# Patient Record
Sex: FEMALE | Race: WHITE
[De-identification: names, ages, dates, MRNs, and addresses within clinical notes are randomized per-mention and may not be internally consistent; named-entity substitution may affect disease eponyms.]

---

## 2018-08-16 ENCOUNTER — HOSPITAL ENCOUNTER (EMERGENCY)
Dept: HOSPITAL 97 - ER | Age: 33
Discharge: HOME | End: 2018-08-16
Payer: SELF-PAY

## 2018-08-16 DIAGNOSIS — Z88.3: ICD-10-CM

## 2018-08-16 DIAGNOSIS — H66.92: Primary | ICD-10-CM

## 2018-08-16 DIAGNOSIS — I10: ICD-10-CM

## 2018-08-16 PROCEDURE — 87070 CULTURE OTHR SPECIMN AEROBIC: CPT

## 2018-08-16 PROCEDURE — 81003 URINALYSIS AUTO W/O SCOPE: CPT

## 2018-08-16 PROCEDURE — 99283 EMERGENCY DEPT VISIT LOW MDM: CPT

## 2018-08-16 PROCEDURE — 87081 CULTURE SCREEN ONLY: CPT

## 2018-08-16 NOTE — EDPHYS
Physician Documentation                                                                           

 [facility]                                                                

Name: Crystal Beaulieu                                                                                 

Age: 33 yrs                                                                                       

Sex: Female                                                                                       

: 1985                                                                                   

MRN: [id number]                                                                                   

Arrival Date: 2018                                                                          

Time: 20:08                                                                                       

Account#: [id number]                                                                            

Bed 19                                                                                            

Private MD:                                                                                       

ED Physician Hernandez De La Cruz                                                                      

HPI:                                                                                              

                                                                                             

21:25 This 33 yrs old  Female presents to ER via Ambulatory with complaints of Ear   cp  

      Pain, Sore Throat.                                                                          

21:25 The patient presents with pain, that is acute, tenderness. The complaints affect the    cp  

      left ear.                                                                                   

21:25 Onset: The symptoms/episode began/occurred 3 day(s) ago.                                cp  

21:25 Associated signs and symptoms: Pertinent positives: sore throat, Pertinent negatives:   cp  

      cough, fever, sinus trouble, vomiting. Severity of symptoms: in the emergency               

      department the symptoms are unchanged despite home interventions.                           

                                                                                                  

OB/GYN:                                                                                           

20:17 LMP 2018                                                                           aj  

                                                                                                  

Historical:                                                                                       

- Allergies:                                                                                      

20:17 Erythromycin;                                                                           aj  

- Home Meds:                                                                                      

20:17 Paxil Oral [Active];                                                                    aj  

- PMHx:                                                                                           

20:17 Hypertension;                                                                           aj  

- PSHx:                                                                                           

20:17 None;                                                                                   aj  

                                                                                                  

- Immunization history:: Adult Immunizations up to date.                                          

- Social history:: Smoking status: Patient/guardian denies using tobacco.                         

- Ebola Screening: : Patient negative for fever greater than or equal to 101.5 degrees            

  Fahrenheit, and additional compatible Ebola Virus Disease symptoms Patient denies               

  exposure to infectious person Patient denies travel to an Ebola-affected area in the            

  21 days before illness onset No symptoms or risks identified at this time.                      

                                                                                                  

                                                                                                  

ROS:                                                                                              

21:30 Constitutional: Negative for body aches, chills, fever, poor PO intake.                 cp  

21:30 Eyes: Negative for injury, pain, redness, and discharge.                                cp  

21:30 ENT: Positive for ear pain, sore throat, Negative for drainage from ear(s), sinus pain,     

      difficulty swallowing, difficulty handling secretions.                                      

21:30 Neck: Negative for pain with movement, pain at rest, stiffness.                             

21:30 Cardiovascular: Negative for chest pain.                                                    

21:30 Respiratory: Negative for cough, shortness of breath, wheezing.                             

21:30 Abdomen/GI: Negative for abdominal pain, nausea, vomiting, and diarrhea.                    

21:30 Skin: Negative for cellulitis, rash.                                                        

21:30 Neuro: Negative for altered mental status, headache, weakness.                              

21:30 All other systems are negative.                                                             

                                                                                                  

Exam:                                                                                             

21:35 Constitutional: The patient appears in no acute distress, alert, awake, non-toxic, well cp  

      developed, well nourished, uncomfortable.                                                   

21:35 Head/Face:  Normocephalic, atraumatic.                                                  cp  

21:35 Eyes: Periorbital structures: appear normal, Pupils: equal, round, and reactive to          

      light and accomodation, Extraocular movements: intact throughout, Conjunctiva: normal,      

      no exudate, no injection, Sclera: no appreciated abnormality, Lids and lashes: appear       

      normal, bilaterally.                                                                        

21:35 ENT: External ear(s): are unremarkable, Ear canal(s): are normal, clear, TM's:              

      erythema, that is mild, on the left, Examination of the other ear shows no obvious          

      abnormality, Nose: is normal, Mouth: is normal, Posterior pharynx: Airway: normal,          

      Tonsils: with erythema, no enlargement, no exudate, Uvula: midline, swelling, is not        

      appreciated, erythema, that is mild, exudate, is not appreciated, Voice: is normal.         

21:35 Neck: ROM/movement: is normal, is supple, without pain, no range of motions                 

      limitations, no meningismus, no nuchal rigidity, Lymph nodes: lymphadenopathy is            

      appreciated, anterior cervical nodes.                                                       

21:35 Chest/axilla: Inspection: normal, Palpation: is normal, no crepitus, no tenderness.         

21:35 Cardiovascular: Rate: normal, Rhythm: regular.                                              

21:35 Respiratory: the patient does not display signs of respiratory distress,  Respirations:     

      normal, no use of accessory muscles, no retractions, no splinting, no tachypnea,            

      labored breathing, is not present, Breath sounds: are clear throughout, no decreased        

      breath sounds, no stridor, no wheezing.                                                     

21:35 Abdomen/GI: Exam negative for discomfort, distension, guarding, Inspection: abdomen         

      appears normal.                                                                             

21:35 Skin: cellulitis, is not appreciated, no rash present.                                      

21:35 Neuro: Orientation: to person, place \T\ time. Mentation: lucid, able to follow commands,   

      Cerebellar function: is grossly normal, Motor: moves all fours, strength is normal,         

      Sensation: no obvious gross deficits.                                                       

                                                                                                  

Vital Signs:                                                                                      

20:17  / 88; Pulse 95; Resp 16; Temp 98.2; Pulse Ox 96% on R/A; Weight 90.72 kg; Height aj  

      5 ft. 6 in. (167.64 cm);                                                                    

21:17  / 90; Pulse 81; Resp 16 S; Pulse Ox 98% on R/A;                                  bs1 

22:17  / 88; Pulse 80; Resp 16; Pulse Ox 100% on R/A;                                   bs1 

23:17  / 94; Pulse 81; Resp 16; Temp 98.0(O); Pulse Ox 99% on R/A; Pain 4/10;           bs1 

20:17 Body Mass Index 32.28 (90.72 kg, 167.64 cm)                                             aj  

                                                                                                  

MDM:                                                                                              

21:19 Patient medically screened.                                                             cp  

22:00 Differential diagnosis: otitis media, otitis externa, ruptured TM, acute otalgia,       cp  

      cerumen impaction, strep throat.                                                            

22:35 Data reviewed: vital signs, nurses notes, lab test result(s), and as a result, I will   cp  

      discharge patient.                                                                          

22:35 Counseling: I had a detailed discussion with the patient and/or guardian regarding: the cp  

      historical points, exam findings, and any diagnostic results supporting the                 

      discharge/admit diagnosis, lab results, to return to the emergency department if            

      symptoms worsen or persist or if there are any questions or concerns that arise at          

      home. Response to treatment: the patient's symptoms have markedly improved after            

      treatment, and as a result, I will discharge patient.                                       

                                                                                                  

                                                                                             

21:23 Order name: Strep; Complete Time: 22:34                                                   

                                                                                             

21:58 Order name: Throat Culture                                                              EDMS

                                                                                             

22:00 Order name: Urine Dipstick--Ancillary (enter results); Complete Time: 22:34             ms  

                                                                                             

22:34 Interpretation: Normal except: USPGR >1.030; UKET 1+.                                     

                                                                                             

21:26 Order name: Urine Pregnancy Test (obtain specimen); Complete Time: 22:03                cp  

                                                                                             

21:26 Order name: Urine Dipstick-Ancillary (obtain specimen); Complete Time: 22:03            cp  

                                                                                                  

Administered Medications:                                                                         

22:12 Drug: Ibuprofen 800 mg Route: PO;                                                       bs1 

23:18 Follow up: Response: No adverse reaction                                                bs1 

22:12 Drug: Lortab Liquid 15 ml Route: PO;                                                    bs1 

23:18 Follow up: Response: No adverse reaction                                                bs1 

22:56 Drug: Augmentin 875 mg Route: PO;                                                       bs1 

23:18 Follow up: Response: No adverse reaction                                                bs1 

                                                                                                  

                                                                                                  

Disposition:                                                                                      

                                                                                             

07:26 Co-signature as Attending Physician, Hernandez De La Cruz MD I agree with the assessment and  [facility] 

      plan of care.                                                                               

                                                                                                  

Disposition:                                                                                      

18 22:36 Discharged to Home. Impression: Otitis media, unspecified, left ear.               

- Condition is Stable.                                                                            

- Discharge Instructions: Otitis Media, Adult.                                                    

- Prescriptions for Naprosyn 500 mg Oral Tablet - take 1 tablet by ORAL route 2 times             

  per day take with food; 20 tablet. Tylenol- Codeine #3 300-30 mg Oral Tablet - take 2           

  tablets by ORAL route every 6 hours As needed; 15 tablet. Augmentin 875- 125 mg Oral            

  Tablet - take 1 tablet by ORAL route every 12 hours for 10 days; 20 tablet.                     

- Medication Reconciliation Form, Thank You Letter, Antibiotic Education, Prescription            

  Opioid Use, Work release form form.                                                             

- Follow up: Private Physician; When: 1 - 2 days; Reason: Recheck today's complaints.             

- Problem is new.                                                                                 

- Symptoms have improved.                                                                         

                                                                                                  

                                                                                                  

                                                                                                  

Signatures:                                                                                       

Dispatcher [employer]                           EDMS                                                 

Delma Greenfield RN                       Hernandez Perdomo MD MD cha Page, Corey, PA                         PA   Tiffanie Redmond, RN                   RN   bs1                                                  

                                                                                                  

Corrections: (The following items were deleted from the chart)                                    

                                                                                             

23:19 22:36 2018 22:36 Discharged to Home. Impression: Otitis media, unspecified, left  bs1 

      ear. Condition is Stable. Forms are Medication Reconciliation Form, Thank You Letter,       

      Antibiotic Education, Prescription Opioid Use. Follow up: Private Physician; When: 1 -      

      2 days; Reason: Recheck today's complaints. Problem is new. Symptoms have improved. cp      

                                                                                                  

**************************************************************************************************

## 2018-08-16 NOTE — XMS REPORT
Patient Summary Document

 Created on:2018



Patient:MINERVA PORTILLO

Sex:Female

:1985

External Reference #:[id number]





Demographics







 Address  [address] [place] [id number]

 

 Home Phone  [phone]

 

 Email Address  NONE

 

 Preferred Language  Unknown

 

 Marital Status  Unknown

 

 Islam Affiliation  Unknown

 

 Race  Unknown

 

 Additional Race(s)  Unavailable

 

 Ethnic Group  Unknown









Author







 Organization  [facility]

 

 Address  [address] [place] [address] [address]



   [place] [address]

 

 Phone  [phone]









Care Team Providers







 Name  Role  Phone

 

 DR TRICIA DE LEON  Unavailable  Unavailable

 

 TEREZA BLACKWOOD  Unavailable  Unavailable

 

 HALLEY,   Unavailable  Unavailable

 

 ENARACELIS, DR HAINES  Unavailable  Unavailable

 

 BA, DR CASTREJON  Unavailable  Unavailable

 

 DARIEN, DR IBANEZ  Unavailable  Unavailable

 

 , DR LEDESMA  Unavailable  Unavailable









Problems

This patient has no known problems.



Allergies, Adverse Reactions, Alerts

This patient has no known allergies or adverse reactions.



Medications

This patient has no known medications.



Encounters







 Start  End  Encounter  Admission  Attending  Care  Care  Encounter



 Date/Time  Date/Time  Type  Type  Clinicians  Facility  Department  ID

 

 2018  Outpatient  E  MOISES  [facility]  [id number]



 19:28:00  23:11:00      TRICIA      

 

 2018  Outpatient  E  JAISON  [facility]  [id number]



 22:28:00  01:55:00      TEREZA      

 

 2018  Emergency  E  LARON ROWLEY  [facility]  [id number]



 15:09:00  19:50:00            

 

 2018  Emergency  E  ADARSH  [facility]  [facility]  [id number]



 00:26:00  01:22:00      ZAKI      

 

 2018  Emergency  E  CASH JOHNSON  [facility]  [id number]



 19:54:00  22:51:00            

 

 2018-01-15  2018-01-15  Emergency  E  DARIEN  [facility]  [facility]  [id number]



 21:40:00  22:52:00      SHAMAR      

 

 2017  Emergency  E  SHEIKH  [facility]  [id number]



 00:07:00  00:56:00      WASIM      







Results







 Test Description  Test Time  Test Comments  Text Results  Atomic Results  
Result Comments









 PROTHROMBIN TIME i-STAT **OW**  2018 06:44:00    









   Test Item  Value  Reference Range  Comments









 PT (test code=PT1)  <10.0 s  10.0-13.0  

 

 INR (test code=INR)  <0.9    

 

 INRH (test code=INRH)       **** SUGGESTED THERAPEUTIC RANGE FOR INR: ****    



      2.5 - 3.5 **  For Patients with Prosthetic    



   Valves or Patients with    



   recurrent Thromboembolic Events **  2.0 - 3.0 **    



   For Most Other Applications **    



CT CHEST W/ CONTRAST *OW*2018 22:43:55Examination: Chest CT with 
contrastLocation code: [id number]: NoneTechnique:Axial postcontrast 
contiguous images were obtained through the chest followedby coronal and 
sagittal reformations. One or more of the following dosereduction techniques 
were used: Automated exposure control, adjustment of themA and or KV according 
to patient size, and/or utilization of iterativereconstruction 
technique.Creatinine 1.0, 82 cc Omnipaque 300Discussion:Clinical history is 
remarkable for dyspnea. Thyroid gland, trachea, and majorbronchi are within 
normal limits. There are no pathologically enlarged lymphnodes present within 
the mediastinum or axilla.No consolidation, effusion, or pneumothorax is 
appreciated.Fatty infiltration of the liver is present. Cystic changes present 
involvingthe posterior right lobe segment 7.Small hiatal hernia is present.No 
lytic or blastic lesions are present within the osseous structures.Impression:
1. No acute cardiopulmonary abnormality.TROPONIN I i-STAT **OW**2018 21:45
:00





 Test Item  Value  Reference Range  Comments

 

 TROPONIN I (test code=A84)  0.000 ng/mL  0.000-0.045  



CHEM8+ i-STAT **OW**2018 21:34:00





 Test Item  Value  Reference Range  Comments

 

 SODIUM (test code=MOSES)  141 mmol/L  138-146  

 

 POTASSIUM (test code=KI)  4.1 mmol/L  3.5-4.9  

 

 CHLORIDE (test code=CLI)  102 mmol/L    

 

 CA IONIZED (test code=ICAI)  1.18 mmol/L  1.12-1.32  

 

 GLUCOSE (test code=GLUI)  81 mg/dL    

 

 TCO2 (test code=TCO2)  28 mmol/L  24-29  

 

 BUN (test code=BUN1)  18 mg/dL  8-26  

 

 CREATININE (test code=CREAI)  1.0 mg/dL  0.6-1.3  

 

 ANION GAP (test code=GANG)  17.0 mmol/L    



PREGNANCY URINE **OW**2018 20:52:00





 Test Item  Value  Reference Range  Comments

 

 PREG UR (test code=PGU)  NEGATIVE  NEGATIVE  



CBC  (INCLUDES AUTOMATED DIFFERENTIAL) *2018 20:27:00





 Test Item  Value  Reference Range  Comments

 

 WBC (test code=WBC)  5.4 10\S\3/uL  4.5-11.0  

 

 RBC (test code=RBC)  4.65 10\S\6/uL  4.30-5.70  

 

 HGB (test code=HBG)  13.3 g/dL  12.0-15.5  

 

 HCT (test code=HCT)  40.3 %  35.0-44.0  

 

 MCV (test code=MCV)  86.7 fL  81.0-99.0  

 

 MCH (test code=MCH)  28.6 pg  27.0-31.0  

 

 MCHC (test code=MCHC)  33.0 g/dL  32.0-36.0  

 

 RDW (test code=RDW)  12.8 %  11.5-14.5  

 

 PLT (test code=PLT)  326 10\S\3/uL  130-400  

 

 MPV (test code=MPV)  7.9 fL  9.4-12.4  

 

 NEUTROP # (test code=NE#)  2.7 10\S\3/uL  1.6-8.0  

 

 LYMPH # (test code=LY#)  2.1 10\S\3/uL  1.1-3.5  

 

 MID # (test code=GMID#)  0.6 10\S\3/uL  0.0-1.1  

 

 GRA  % (test code=GRA%)  50.0 %  35.0-73.0  

 

 LYMPH % (test code=GLY%)  39.4 %  20.0-55.0  

 

 MID % (test code=GMID%)  10.6 %  0.0-10.0  



PROTHROMBIN TIME i-STAT **OW**2018 14:07:00





 Test Item  Value  Reference Range  Comments

 

 PT (test code=PT1)  12.2 s  10.0-13.0  

 

 INR (test code=INR)  1.0    

 

 INRH (test code=INRH)       **** SUGGESTED THERAPEUTIC RANGE    



   FOR INR: ****    2.5 - 3.5 **  For    



   Patients with Prosthetic Valves or    



   Patients with    



   recurrent Thromboembolic Events **    



   2.0 - 3.0 ** For Most Other    



   Applications **    



CBC  (INCLUDES AUTOMATED DIFFERENTIAL) *2018 14:02:00





 Test Item  Value  Reference Range  Comments

 

 WBC (test code=WBC)  2.6 10\S\3/uL  4.5-11.0  

 

 RBC (test code=RBC)  6.63 10\S\6/uL  4.30-5.70  

 

 HGB (test code=HBG)  18.6 g/dL  12.0-15.5  

 

 HCT (test code=HCT)  57.3 %  35.0-44.0  

 

 MCV (test code=MCV)  86.4 fL  81.0-99.0  

 

 MCH (test code=MCH)  28.1 pg  27.0-31.0  

 

 MCHC (test code=MCHC)  32.5 g/dL  32.0-36.0  

 

 RDW (test code=RDW)  12.6 %  11.5-14.5  

 

 PLT (test code=PLT)  117 10\S\3/uL  130-400  

 

 MPV (test code=MPV)  8.7 fL  9.4-12.4  

 

 NEUTROP # (test code=NE#)  1.0 10\S\3/uL  1.6-8.0  

 

 LYMPH # (test code=LY#)  1.3 10\S\3/uL  1.1-3.5  

 

 MID # (test code=GMID#)  0.3 10\S\3/uL  0.0-1.1  

 

 GRA  % (test code=GRA%)  39.8 %  35.0-73.0  

 

 LYMPH % (test code=GLY%)  49.0 %  20.0-55.0  

 

 MID % (test code=GMID%)  11.2 %  0.0-10.0  



U/S VENOUS DOPPLER LT UPPER EXT *OW*2018 00:30:16AFTER HOURS SERVICE ON:  12:30 AMLeft Upper Extremity Venous Duplex Doppler ExaminationLocation 
Code U44Xppudgk: M79.89: OTHER SPECIFIED SOFT TISSUE DISORDERSTechnique: Real-
time gray scale, Doppler spectral analysis and Doppler colorflow evaluation was 
performed using a dedicated transducer. Graded compressionwith augmentation 
were performed. Findings: Upper extremity veins were sampled including the 
jugular, subclavian, axillary,brachial, basilic and cephalic veins. No 
echogenic filling defects are seen to suggest deep venous thrombosis. Thereis 
normal response to compression.  There are normal venous waveforms.  Impression:
No sonographic evidence of DVT in the imaged vessels.U/S [id number] 19:45:
12EXAM: US PELVIS TRANSABDOMINALEXAM: US PELVIS TRANSVAGINALDATE: 2018 6:04 
PM INDICATION: Pelvic pain, concern for ovarian torsion ADDITIONAL INFORMATION: 
33-year-old G6 P [id number]: None. TECHNIQUE:  Multiplanar grayscale and 
color Doppler ultrasound of the pelviswere obtained:Transabdominally through a 
distended urinary bladder.Transvaginally postvoid.LOCATION: Y3CZMQENMD: Uterus/
Myometrium:Size: 7.4 x 5.0 x 6.3 cmOrientation: AntevertedEchogenicity: 
Normal.Masses: None.Cervix: Normal.Endometrium: NormalThickness: 0.9 cmCysts/
Masses: None.Right ovary:Size: 4.4 x 4.2 x 4.0 cmCysts/Masses:There is a 
hypoechoic cyst with reticulated echogenic structuresinternally measuring 4.0 x 
3.6 x 3.3cm.Arterial and Doppler waveforms are demonstratedLeft ovary: Size: 
2.9 x 1.8 x 1.9 cmCysts/Masses: None.Arterial and Doppler waveforms are 
demonstratedAdnexa: Normal. Free fluid: None.Other: None.IMPRESSION:1.  Normal 
Doppler waveforms are demonstrated in each ovary.2.  Right ovarian hemorrhagic 
cyst.CT ABDOMEN AND PELVIS WITH [id number] 17:48:38EXAM:  CT ABDOMEN 
AND PELVIS WITH CONTRASTDATE: 2018 3:39 PM INDICATION: Abdominal pain 
ADDITIONAL INFORMATION: None.COMPARISON: None.TECHNIQUE: Volumetric CT 
acquisition of the abdomen and pelvis after theintravenous administration 
contrast. Axial, coronal and sagittalreconstructions.Postcontrastphases: Venous 
and delayed.IV contrast: 98 mL Omnipaque 300Enteric contrast: None.DLP: 1886 mGy
-cmLOCATION: X2TNSQPHXL:  Lines, tubes and hardware: None.Lower thorax: 
Clear.Liver: There is a lobulated lesion in the posterior aspect of the right 
hepaticlobe measures 1.8 x 3.0 cm. It is new compared to 2012.Biliary tree: No 
intra- or extrahepatic biliary ductal dilation.Gallbladder: Normal. No CT 
evidence of gallstones.Pancreas: Normal.Spleen: Normal.Adrenals: Normal.Kidneys 
and ureters: Normal.Bladder: Normal.Reproductive organs: There is a cyst in the 
right ovary measuring 4.0 x 3.3 cm.Gastrointestinal tract: Normal 
caliber.Appendix: Normal.Peritoneum and retroperitoneum: No ascites or free 
air. No other fluidcollection.Lymph nodes: Normal.Vasculature: Normal.Bones: 
There is moderate levoconvex scoliotic curvature of the lumbar spine.Soft 
tissues: Normal.IMPRESSION:  1.  No acute abnormality2.  A lobulated hypodense 
lesion at the posterior aspect of the right hepaticlobe measuring 3 x 1.8 cm 
isnew compared to 2012. The lesion hascharacteristics compatible with hepatic 
cyst. Given that the lesion is new, 3month interval follow up with abdominal 
ultrasound would be recommended.3.  Right ovarian cyst.RECOMMENDATIONS: 
None.CBC (INCLUDES AUTOMATED DIFFERENTIAL)2018 16:15:00





 Test Item  Value  Reference Range  Comments

 

 WBC (test code=WBC)  6.3 10\S\3/uL  4.5-11.0  

 

 RBC (test code=RBC)  4.98 10\S\6/uL  4.30-5.70  

 

 HGB (test code=HBG)  14.2 g/dL  12.0-15.5  

 

 HCT (test code=HCT)  43.4 %  35.0-44.0  

 

 MCV (test code=MCV)  87.1 fL  81.0-99.0  

 

 MCH (test code=MCH)  28.5 pg  27.0-31.0  

 

 MCHC (test code=MCHC)  32.7 g/dL  32.0-36.0  

 

 RDW (test code=RDW)  13.1 %  11.5-14.5  

 

 PLT (test code=PLT)  273 10\S\3/uL  130-400  

 

 MPV (test code=MPV)  10.5 fL  9.4-12.4  

 

 NEUTROP # (test code=NE#)  5.1 10\S\3/uL  1.6-8.0  

 

 LYMPH # (test code=LY#)  0.7 10\S\3/uL  1.1-3.5  

 

 MONOCYTE # (test code=MO#)  0.4 10\S\3/uL  0.0-1.1  

 

 EOSINOPH # (test code=EO#)  0.1 10\S\3/uL  0.0-0.7  

 

 BASOPHIL # (test code=BA#)  0.0 10\S\3/uL  0.0-0.3  

 

 IG # (test code=IG#)  0.02 10\S\3/uL  0.00-0.06  

 

 NRBC # (test code=NRBC#)  0.00 10\S\3/uL  0.00-0.01  

 

 NEUTROPH % (test code=NE%)  80.4 %  35.0-73.0  

 

 LYMPH % (test code=LY%)  11.1 %  20.0-55.0  

 

 MONO % (test code=MO%)  7.0 %  2.5-10.0  

 

 EOSINOPH % (test code=EO%)  1.0 %  0.0-5.0  

 

 BASOPHIL % (test code=BA%)  0.2 %  0.0-2.0  

 

 IG % (test code=IG%)  0.3 %  0.0-0.8  

 

 NRBC% (test code=NRBC%)  0.0 %  0.0-0.2  

 

 MANDIFF (test code=MDIFF)  NO  NO  

 

 RBC MORPH (test code=RBCMOR)    NORMAL  



URINALYSIS WITH [id number] 16:02:00





 Test Item  Value  Reference Range  Comments

 

 COLOR (test code=COLU)  YELLOW  YELLOW  

 

 CLARITY (test code=CLA)  HAZY  CLEAR  

 

 GLUCOSE UR (test code=UA GLUCOSE)  NEGATIVE  NEGATIVE  

 

 BILI UR (test code=BILE)  NEGATIVE  NEGATIVE  

 

 KETONES UR (test code=ACE)  1+  NEGATIVE  

 

 SP GRAVITY (test code=SPGR)  1.023  1.005-1.030  

 

 PH UR (test code=PH)  6.5  4.5-8.0  

 

 PROTEIN UR (test code=PU)  NEGATIVE  NEGATIVE  

 

 UROBIL UR (test code=UROQ)  1.0 EU/dL  0.2-1.0  

 

 NITRITE UR (test code=NITRITE)  NEGATIVE  NEGATIVE  

 

 BLOOD UR (test code=UA BLOOD)  NEGATIVE  NEGATIVE  

 

 LEUK ES UR (test code=LEUK)  1+  NEGATIVE  

 

 WBC UR (test code=UWBC)  5 /HPF  0-5  

 

 RBC UR (test code=URBC)  4 /HPF  0-2  

 

 EPITH  UR (test code=UEPC)  MODERATE /LPF  FEW  

 

 BACTERIA UR (test code=UBACT)  FEW /HPF  NONE  

 

 CAST UR (test code=CAST)   /LPF  NONE  

 

 CRYSTAL UR (test code=CRYU)   / LPF  NONE  

 

 MUCUS UR (test code=MUC)   / HPF  NONE  

 

 AMORPH UR (test code=LUIS)   / HPF  NONE  

 

 TRICH UR (test code=UTRICH)   /HPF  NONE  

 

 YEAST UR (test code=UY)   /HPF  NONE  

 

 SPERM UR (test code=USPERM)   /HPF  NONE  



PREGNANCY URINE [id number] 15:57:00





 Test Item  Value  Reference Range  Comments

 

 PREG UR (test code=PGU)  NEGATIVE  NEGATIVE  



XR ANKLE RIGHT COMPLETE 3 VIEWS **2018 01:19:57RIGHT ANKLE RADIOGRAPHS, 
3 VIEWSLOCATION: R16.INDICATION: [id number]: Ankle pain.COMPARISON: 
None.TECHNIQUE: AP, oblique, and lateral radiographs of the right ankle.FINDINGS
:There is no acute fracture or dislocation. The joint spaces are 
maintained.IMPRESSION:No acute osseous abnormality.U/S VENOUS DOPPLER LT UPPER 
EXT**2018 22:41:35EXAM: U/S VENOUS DOPPLER LT UPPER EXT**HISTORY: 
[id number]: Arm swellingTECHNIQUE: Multiplanar real-time ultrasonography of the 
left upper extremityvenous system using gray-scale imaging, supplementedby 
Doppler, augmentation,and compression maneuvers as needed.COMPARISON: 
None.FINDINGS: Internal jugular vein: PatentSubclavian vein:  Patent Axillary 
vein:  PatentBrachial vein: PatentCephalic vein:  PatentBasilic vein: Patent 
and compressibleRadial vein: PatentUlnar vein: PatentVenous compressibility:  
NormalWaveform response to augmentation:  NormalIMPRESSION:No evidence of deep 
vein thrombosisD-DIMER **2018 20:54:00





 Test Item  Value  Reference Range  Comments

 

 D-DIMER (test code=DDI)  <200 ng/mL D-DU  0-234  

 

 D-DIMER COMMENT (test  *Level to rule out DVT or PE:    



 code=DDCOM)  <235 ng/mL D-DU*    



PRO TIME AND PTT  *WW*2018 20:49:00





 Test Item  Value  Reference Range  Comments

 

 PT (test code=TT)  11.2 s  9.8-13.6  

 

 INR (test code=INR)  1.0    

 

 INRH (test code=INRH)       **** SUGGESTED THERAPEUTIC RANGE    



   FOR INR: ****    2.5 - 3.5 **  For    



   Patients with Prosthetic Valves or    



   Patients with    



   recurrent Thromboembolic Events **    



   2.0 - 3.0 ** For Most Other    



   Applications **    

 

 PTT (test code=PTT)  27.3 s  20.2-38.0  

 

 PTTH (test code=PTTH)  **** To monitor the effectiveness of    



   heparin, we offer the Anti-Xa    



   (Heparin Assay). It can be used for    



   either unfractionated or LMW  Heparin.    



   Order Code is ANTI-XA ****    



COMPREHENSIVE METABOLIC PAN *WW*2018 20:40:00





 Test Item  Value  Reference Range  Comments

 

 GLUCOSE (test code=06D)  94 mg/dL    

 

 SODIUM (test code=01A)  138 mmol/L  136-145  

 

 POTASSIUM (test code=01B)  3.8 mmol/L  3.6-5.1  

 

 CHLORIDE (test code=04A)  104 mmol/L    

 

 CO2 (test code=02A)  28 mmol/L  22-32  

 

 ANION GAP (test code=ANG)  9.8 mmol/L    

 

 BUN (test code=05D)  17 mg/dL  7-18  

 

 CREATININE (test code=03E)  0.9 mg/dL  0.4-1.1  

 

 BUN/CREA (test code=BCR)  19  12-20  

 

 CALCIUM (test code=09D)  8.7 mg/dL  8.3-9.5  

 

 BILI TOTAL (test code=11A)  0.2 mg/dL  0.2-1.0  

 

 PROTEIN (test code=07D)  8.0 g/dL  6.4-8.2  

 

 ALBUMIN (test code=08D)  4.2 g/dL  3.5-4.8  

 

 GLOBULIN (test code=GLB)  3.8 g/dL  1.5-3.8  

 

 ALB/GLOB (test code=AGRR)  1.1  1.0-2.6  

 

 ALK PHOS (test code=35A)  79 IU/L    

 

 AST (test code=30A)  14 IU/L  <=42  

 

 ALT (test code=31A)  20 IU/L  <=78  



CBC (INCLUDES AUTOMATED DIFFERENTIAL)*[id number] 20:25:00





 Test Item  Value  Reference Range  Comments

 

 WBC (test code=WBC)  7.8 10\S\3/uL  4.5-11.0  

 

 RBC (test code=RBC)  4.30 10\S\6/uL  4.30-5.70  

 

 HGB (test code=HBG)  12.4 g/dL  12.0-15.5  

 

 HCT (test code=HCT)  38.1 %  35.0-44.0  

 

 MCV (test code=MCV)  88.6 fL  81.0-99.0  

 

 MCH (test code=MCH)  28.8 pg  27.0-31.0  

 

 MCHC (test code=MCHC)  32.5 g/dL  32.0-36.0  

 

 RDW (test code=RDW)  13.4 %  11.5-14.5  

 

 PLT (test code=PLT)  275 10\S\3/uL  130-400  

 

 MPV (test code=MPV)  10.7 fL  9.4-12.4  

 

 NEUTROP # (test code=NE#)  4.8 10\S\3/uL  1.6-8.0  

 

 LYMPH # (test code=LY#)  2.0 10\S\3/uL  1.1-3.5  

 

 MONOCYTE # (test code=MO#)  0.7 10\S\3/uL  0.0-1.1  

 

 EOSINOPH # (test code=EO#)  0.2 10\S\3/uL  0.0-0.7  

 

 BASOPHIL # (test code=BA#)  0.1 10\S\3/uL  0.0-0.3  

 

 IG # (test code=IG#)  0.02 10\S\3/uL  0.00-0.06  

 

 NRBC # (test code=NRBC#)  0.00 10\S\3/uL  0.00-0.01  

 

 NEUTROPH % (test code=NE%)  61.7 %  35.0-73.0  

 

 LYMPH % (test code=LY%)  25.7 %  20.0-55.0  

 

 MONO % (test code=MO%)  8.6 %  2.5-10.0  

 

 EOSINOPH % (test code=EO%)  3.1 %  0.0-5.0  

 

 BASOPHIL % (test code=BA%)  0.6 %  0.0-2.0  

 

 IG % (test code=IG%)  0.3 %  0.0-0.8  

 

 NRBC% (test code=NRBC%)  0.0 %  0.0-0.2  

 

 MANDIFF (test code=WMDIFF)  NO  NO  

 

 RBC MORPH (test code=WRBCMOR)    NORMAL  



XR FOOT LEFT COMPLETE 3 VIEWS2018-01-15 22:21:34XR FOOT LEFT COMPLETE 3 
VIEWSLocation:F37Isgym hours services provided1/15/2018 10:20 PMIndication:
Lfoot pain; fellComparison:Not availableFindings:Mild calcaneal spurring. No 
acute fracture or dislocation is seen. Thejoint spaces appear preserved. Bony 
mineralization appears normal. Noradiopaque foreign body.Impression:No acute 
bony abnormality.XR CHEST 2 VIEW *WW*2017 00:15:12Exam: Chest 2 
viewsLocation: L7Xtphvbc: cough ans sobComparison: NoneFindings:The lungs are 
clear. No infiltrate or effusion is seen. The pulmonaryvasculature is normal. 
The heart size is normal. The mediastinal silhouette isunremarkable. The bony 
thorax is intact with a dextroscoliosis noted.Impression:No acute disease.

## 2018-08-16 NOTE — ER
Nurse's Notes                                                                                     

 [facility]                                                                

Name: Crystal Beaulieu                                                                                 

Age: 33 yrs                                                                                       

Sex: Female                                                                                       

: 1985                                                                                   

MRN: [id number]                                                                                   

Arrival Date: 2018                                                                          

Time: 20:08                                                                                       

Account#: [id number]                                                                            

Bed 19                                                                                            

Private MD:                                                                                       

Diagnosis: Otitis media, unspecified, left ear                                                    

                                                                                                  

Presentation:                                                                                     

                                                                                             

20:15 Presenting complaint: Patient states: Left ear pain for 3 days. Transition of care:     aj  

      patient was not received from another setting of care. Onset of symptoms was 2018. Risk Assessment: Do you want to hurt yourself or someone else? Patient reports no     

      desire to harm self or others. Initial Sepsis Screen: Does the patient meet any 2           

      criteria? No. Patient's initial sepsis screen is negative. Does the patient have a          

      suspected source of infection? No. Patient's initial sepsis screen is negative. Care        

      prior to arrival: None.                                                                     

20:15 Method Of Arrival: Ambulatory                                                             

20:15 Acuity: DON 4                                                                             

20:15 Acuity: DON 5                                                                             

                                                                                                  

Triage Assessment:                                                                                

20:17 General: Appears in no apparent distress. comfortable, Behavior is calm, cooperative,   aj  

      appropriate for age. Pain: Complains of pain in left ear. EENT: Reports pain in left        

      ear. Neuro: Level of Consciousness is awake, alert, obeys commands, Oriented to person,     

      place, time, situation, Appropriate for age. Respiratory: Airway is patent Respiratory      

      effort is even, unlabored, Respiratory pattern is regular, symmetrical. Derm: Skin is       

      intact, is healthy with good turgor, Skin is pink, warm \T\ dry. normal.                    

                                                                                                  

OB/GYN:                                                                                           

20:17 LMP 2018                                                                           aj  

                                                                                                  

Historical:                                                                                       

- Allergies:                                                                                      

20:17 Erythromycin;                                                                           aj  

- Home Meds:                                                                                      

20:17 Paxil Oral [Active];                                                                    aj  

- PMHx:                                                                                           

20:17 Hypertension;                                                                           aj  

- PSHx:                                                                                           

20:17 None;                                                                                   aj  

                                                                                                  

- Immunization history:: Adult Immunizations up to date.                                          

- Social history:: Smoking status: Patient/guardian denies using tobacco.                         

- Ebola Screening: : Patient negative for fever greater than or equal to 101.5 degrees            

  Fahrenheit, and additional compatible Ebola Virus Disease symptoms Patient denies               

  exposure to infectious person Patient denies travel to an Ebola-affected area in the            

  21 days before illness onset No symptoms or risks identified at this time.                      

                                                                                                  

                                                                                                  

Screenin:48 Abuse screen: Denies threats or abuse. Denies injuries from another. Nutritional        bs1 

      screening: No deficits noted. Tuberculosis screening: No symptoms or risk factors           

      identified. Fall Risk None identified.                                                      

                                                                                                  

Assessment:                                                                                       

20:25 General: Appears in no apparent distress. uncomfortable, Behavior is calm, cooperative, bs1 

      appropriate for age. Pain: Complains of pain in left ear, throat. Neuro: Level of           

      Consciousness is awake, alert, obeys commands, Oriented to person, place, time,             

      situation, Appropriate for age. Cardiovascular: Heart tones S1 S2 present. Respiratory:     

      Airway is patent. GI: No signs and/or symptoms were reported involving the                  

      gastrointestinal system. : No signs and/or symptoms were reported regarding the           

      genitourinary system. EENT: Ear canal mild redness noted to left ear, patient reports       

      pain to left ear. Throat is reddened.                                                       

21:30 Reassessment: Patient appears in no apparent distress at this time. Patient and/or      bs1 

      family updated on plan of care and expected duration. Pain level reassessed. Patient is     

      alert, oriented x 3, equal unlabored respirations, skin warm/dry/pink. Patient c/o pain     

      in throat/ear.                                                                              

22:55 Reassessment: Patient appears in no apparent distress at this time. Patient and/or      bs1 

      family updated on plan of care and expected duration. Pain level reassessed. Patient is     

      alert, oriented x 3, equal unlabored respirations, skin warm/dry/pink. Patient received     

      Augmentin 875, discharge papers given. Informed patient that we will keep her here for      

      about 15 more minutes to make sure she does not have a reaction to medication.              

23:18 Reassessment: No reaction occurred.                                                     bs1 

                                                                                                  

Vital Signs:                                                                                      

20:17  / 88; Pulse 95; Resp 16; Temp 98.2; Pulse Ox 96% on R/A; Weight 90.72 kg; Height aj  

      5 ft. 6 in. (167.64 cm);                                                                    

21:17  / 90; Pulse 81; Resp 16 S; Pulse Ox 98% on R/A;                                  bs1 

22:17  / 88; Pulse 80; Resp 16; Pulse Ox 100% on R/A;                                   bs1 

23:17  / 94; Pulse 81; Resp 16; Temp 98.0(O); Pulse Ox 99% on R/A; Pain 4/10;           bs1 

20:17 Body Mass Index 32.28 (90.72 kg, 167.64 cm)                                             franca  

                                                                                                  

ED Course:                                                                                        

20:08 Patient arrived in ED.                                                                  ds1 

20:16 Triage completed.                                                                       aj  

20:17 Arm band placed on right wrist. Patient placed in an exam room.                         aj  

20:26 Tiffanie Centeno, RN is Primary Nurse.                                                 bs1 

20:48 Patient has correct armband on for positive identification. Bed in low position. Call   bs1 

      light in reach. Side rails up X 1. Pulse ox on. NIBP on.                                    

21:18 Hernandez Mahmood PA is PHCP.                                                                cp  

21:18 Hernandez De La Cruz MD is Attending Physician.                                             cp  

22:59 No provider procedures requiring assistance completed. Patient did not have IV access   bs1 

      during this emergency room visit.                                                           

                                                                                                  

Administered Medications:                                                                         

22:12 Drug: Ibuprofen 800 mg Route: PO;                                                       bs1 

23:18 Follow up: Response: No adverse reaction                                                bs1 

22:12 Drug: Lortab Liquid 15 ml Route: PO;                                                    bs1 

23:18 Follow up: Response: No adverse reaction                                                bs1 

22:56 Drug: Augmentin 875 mg Route: PO;                                                       bs1 

23:18 Follow up: Response: No adverse reaction                                                bs1 

                                                                                                  

                                                                                                  

Outcome:                                                                                          

22:36 Discharge ordered by MD.                                                                cp  

23:19 Patient left the ED.                                                                    bs1 

                                                                                                  

Signatures:                                                                                       

Delma Greenfield, RN                       RN   Roshni Ho                                ds1                                                  

Hernandez Mahmood PA                         PA   Tiffanie Redmond, NICO                   RN   bs1                                                  

                                                                                                  

**************************************************************************************************

## 2018-08-16 NOTE — XMS REPORT
Clinical Summary

 Created on:2018



Patient:Crystal Beaulieu

Sex:Female

:1985

External Reference #:[id number]





Demographics







 Address  [address] [place] [id number]

 

 Home Phone  [phone]

 

 Email Address  [email or web address]

 

 Preferred Language  English

 

 Marital Status  

 

 Zoroastrian Affiliation  Unknown

 

 Race  White

 

 Ethnic Group   or 









Author







 Organization  [facility]

 

 Address  [address] [place] [address]









Support







 Name  Relationship  Address  Phone

 

 None,None  Unavailable  [address] [facility]  [phone]



     [place] [address]  









Care Team Providers







 Name  Role  Phone

 

 Florence Casiano DO  Primary Care Provider  [phone]









Allergies







 Active Allergy  Reactions  Severity  Noted Date  Comments

 

 Azithromycin      06/10/2018  

 

 Hydromorphone  Itching    06/10/2018  







Current Medications







 Prescription  Sig.  Disp.  Refills  Start Date  End Date  Status

 

 acetaminophen-codeine  Take 1 tablet by  12 tablet  0  2018  




 (TYLENOL WITH CODEINE  mouth every 6          



 #3) 300-30 mg per  (six) hours as          



 tablet  needed for          



   moderate pain          



   for up to 7          



   days.          







Active Problems

Not on file



Encounters







 Date  Type  Specialty  Care Team  Description

 

 06/10/2018 -  Emergency  Emergency Medicine  Edgardo Tony  Contusion of 
left



 2018      DO Murray  upper extremity,



         initial encounter



         (Primary Dx)



after 08/15/2017



Social History







 Tobacco Use  Types  Packs/Day  Years Used  Date

 

 Never Smoker        









 Smokeless Tobacco: Never Used      









 Alcohol Use  Drinks/Week  oz/Week  Comments

 

 Yes      socially









 Sex Assigned at Birth  Date Recorded

 

 Not on file  







Last Filed Vital Signs







 Vital Sign  Reading  Time Taken

 

 Blood Pressure  122/87  2018  1:07 AM CDT

 

 Pulse  85  2018  1:07 AM CDT

 

 Temperature  35.7 C (96.2 F)  06/10/2018 10:31 PM CDT

 

 Respiratory Rate  16  2018  1:07 AM CDT

 

 Oxygen Saturation  99%  2018  1:07 AM CDT

 

 Inhaled Oxygen Concentration  -  -

 

 Weight  -  -

 

 Height  167.6 cm (5' 6")  06/10/2018 10:20 PM CDT

 

 Body Mass Index  -  -







Plan of Treatment







 Health Maintenance  Due Date  Last Done  Comments

 

 CERVICAL CANCER SCREENING  2006    

 

 INFLUENZA VACCINE  2018    







Procedures







 Procedure Name  Priority  Date/Time  Associated Diagnosis  Comments

 

  DUPLEX VENOUS  STAT  2018 12:18 AM    Results for this



 UPPER EXTREMITY    CDT    procedure are in



 LEFT        the results



         section.



after 08/15/2017



Results

PV Duplex Venous Upper Extremity (2018 12:18 AM)





 Narrative  Performed At

 

 US DUPLEX VENOUS UPPER EXTREMITY LEFT



   [facility]



  



  



 CLINICAL INDICATION:lue bruisingupper arm and ac spacefrom  



 iv's...now with pain worsening



  



  



  



 COMPARISON:None.



  



  



  



 COMMENTS:



  



  



  



 Sonographic evaluation of the left upper extremity was performed  



 utilizing compression sonography and color Doppler interrogation.



  



  



  



 The left internal jugular vein, subclavian vein, axillary vein, brachial  



 veins, basilic vein and cephalic vein are normal in course and caliber  



 with normal color Doppler signal. There is normal compressibility of  



 these vessels and flow variation where 



  



 interrogated.



  



  



  



 Visualized veins in the forearm are unremarkable without thrombosis.



  



  



  



 The contralateral subclavian vein was also interrogated and demonstrates  



 appropriate Doppler signal.



  



  



  



 There is an ill-defined echogenic area within the soft tissues in the  



 area of bruising that measures 2.1 x 0.8 x 1.5 cm in size. This most  



 likely represents a small, subcutaneous hematoma.



  



  



  



  



  



 IMPRESSION:



  



  



  



 1. No sonographic evidence of deep venous thrombosis in the left upper  



 extremity. 



  



  



  



 2. Ill-defined echogenic area within the region of bruising, measuring  



 2.1 x 0.8 x 1.5 cm in size and likely representing a small subcutaneous  



 hematoma.



  



  



  



  



  



  



  



  



  



 [facility]-[id number]



  



   









 Procedure Note

 

  Interface, Radiology Results Incoming - 2018 12:25 AM CDT



US DUPLEX VENOUS UPPER EXTREMITY LEFT



 



 CLINICAL INDICATION:  lue bruising  upper arm and ac space  from iv's...now 
with pain worsening



 



 COMPARISON:  None.



 



 COMMENTS:



 



 Sonographic evaluation of the left upper extremity was performed utilizing 
compression sonography and color Doppler interrogation.



 



 The left internal jugular vein, subclavian vein, axillary vein, brachial veins
, basilic vein and cephalic vein are normal in course and caliber with normal 
color Doppler signal. There is normal compressibility of these



 vessels and flow variation where



 interrogated.



 



 Visualized veins in the forearm are unremarkable without thrombosis.



 



 The contralateral subclavian vein was also interrogated and demonstrates 
appropriate Doppler signal.



 



 There is an ill-defined echogenic area within the soft tissues in the area of 
bruising that measures 2.1 x 0.8 x 1.5 cm in size. This most likely represents 
a small, subcutaneous hematoma.



 



 



 IMPRESSION:



 



 1. No sonographic evidence of deep venous thrombosis in the left upper 
extremity.



 



 2. Ill-defined echogenic area within the region of bruising, measuring 2.1 x 
0.8 x 1.5 cm in size and likely representing a small subcutaneous hematoma.



 



 



 



 



 [facility]-[id number]









 Performing Organization  Address  City/State/Zipcode  Phone Number

 

 [facility] [facility] [employer]  [address] [place] [address]  



after 08/15/2017

## 2018-08-17 VITALS — SYSTOLIC BLOOD PRESSURE: 130 MMHG | TEMPERATURE: 98 F | DIASTOLIC BLOOD PRESSURE: 94 MMHG | OXYGEN SATURATION: 99 %

## 2018-08-18 ENCOUNTER — HOSPITAL ENCOUNTER (EMERGENCY)
Dept: HOSPITAL 97 - ER | Age: 33
Discharge: HOME | End: 2018-08-18
Payer: SELF-PAY

## 2018-08-18 VITALS — SYSTOLIC BLOOD PRESSURE: 144 MMHG | DIASTOLIC BLOOD PRESSURE: 97 MMHG | OXYGEN SATURATION: 98 %

## 2018-08-18 VITALS — TEMPERATURE: 98.8 F

## 2018-08-18 DIAGNOSIS — R07.0: Primary | ICD-10-CM

## 2018-08-18 DIAGNOSIS — H66.92: ICD-10-CM

## 2018-08-18 DIAGNOSIS — I10: ICD-10-CM

## 2018-08-18 DIAGNOSIS — Z88.1: ICD-10-CM

## 2018-08-18 PROCEDURE — 96372 THER/PROPH/DIAG INJ SC/IM: CPT

## 2018-08-18 PROCEDURE — 81025 URINE PREGNANCY TEST: CPT

## 2018-08-18 PROCEDURE — 99283 EMERGENCY DEPT VISIT LOW MDM: CPT

## 2018-08-18 PROCEDURE — 70360 X-RAY EXAM OF NECK: CPT

## 2018-08-18 PROCEDURE — 81003 URINALYSIS AUTO W/O SCOPE: CPT

## 2018-08-18 NOTE — XMS REPORT
Patient Summary Document

 Created on:2018



Patient:MINERVA PORTILLO

Sex:Female

:1985

External Reference #:[id number]





Demographics







 Address  [address] [place] [address]

 

 Home Phone  [phone]

 

 Work Phone  [phone]

 

 Email Address  DECLINED

 

 Preferred Language  Unknown

 

 Marital Status  Unknown

 

 Adventism Affiliation  Unknown

 

 Race  Unknown

 

 Additional Race(s)  Unavailable

 

 Ethnic Group  Unknown









Author







 Organization  [facility]

 

 Address  [address] [place] [address] [address]



   [place] [address]

 

 Phone  [phone]









Care Team Providers







 Name  Role  Phone

 

 MOISES, DR TRICIA CHRISTIANSEN  Unavailable  Unavailable

 

 TEREZA BLACKWOOD  Unavailable  Unavailable

 

 HALLEY,   Unavailable  Unavailable

 

 ENARACELIS, DR HAINES  Unavailable  Unavailable

 

 BA, DR CASTREJON  Unavailable  Unavailable

 

 DARIEN, DR IBANEZ  Unavailable  Unavailable

 

 SRIVASTAVA, DR LEDESMA  Unavailable  Unavailable









Problems

This patient has no known problems.



Allergies, Adverse Reactions, Alerts

This patient has no known allergies or adverse reactions.



Medications

This patient has no known medications.



Encounters







 Start  End  Encounter  Admission  Attending  Care  Care  Encounter



 Date/Time  Date/Time  Type  Type  Clinicians  Facility  Department  ID

 

 2018  Outpatient  E  MOISES  [facility]  [id number]



 19:28:00  23:11:00      TRICIA      

 

 2018  Outpatient  E  JAISON  [facility]  [id number]



 22:28:00  01:55:00      TEREZA      

 

 2018  Emergency  E  LARON ROWLEY  [facility]  [id number]



 15:09:00  19:50:00            

 

 2018  Emergency  E  ADARSH  [facility]  [id number]



 00:26:00  01:22:00      ZAKI      

 

 2018  Emergency  E  CASH JOHNSON  [facility]  [id number]



 19:54:00  22:51:00            

 

 2018-01-15  2018-01-15  Emergency  E  DARIEN  [facility]  [id number]



 21:40:00  22:52:00      SHAMAR      

 

 2017  Emergency  E  SHEIKH  [facility]  [id number]



 00:07:00  00:56:00      WASIM      







Results







 Test Description  Test Time  Test Comments  Text Results  Atomic Results  
Result Comments









 PROTHROMBIN TIME i-STAT **OW**  2018 06:44:00    









   Test Item  Value  Reference Range  Comments









 PT (test code=PT1)  <10.0 s  10.0-13.0  

 

 INR (test code=INR)  <0.9    

 

 INRH (test code=INRH)       **** SUGGESTED THERAPEUTIC RANGE FOR INR: ****    



      2.5 - 3.5 **  For Patients with Prosthetic    



   Valves or Patients with    



   recurrent Thromboembolic Events **  2.0 - 3.0 **    



   For Most Other Applications **    



CT CHEST W/ CONTRAST *OW*2018 22:43:55Examination: Chest CT with 
contrastLocation code: D3Khqllcadjz: NoneTechnique:Axial postcontrast 
contiguous images were obtained through the chest followedby coronal and 
sagittal reformations. One or more of the following dosereduction techniques 
were used: Automated exposure control, adjustment of themA and or KV according 
to patient size, and/or utilization of iterativereconstruction 
technique.Creatinine 1.0, 82 cc Omnipaque 300Discussion:Clinical history is 
remarkable for dyspnea. Thyroid gland, trachea, and majorbronchi are within 
normal limits. There are no pathologically enlarged lymphnodes present within 
the mediastinum or axilla.No consolidation, effusion, or pneumothorax is 
appreciated.Fatty infiltration of the liver is present. Cystic changes present 
involvingthe posterior right lobe segment 7.Small hiatal hernia is present.No 
lytic or blastic lesions are present within the osseous structures.Impression:
1. No acute cardiopulmonary abnormality.TROPONIN I i-STAT **OW**2018 21:45
:00





 Test Item  Value  Reference Range  Comments

 

 TROPONIN I (test code=A84)  0.000 ng/mL  0.000-0.045  



CHEM8+ i-STAT **OW**2018 21:34:00





 Test Item  Value  Reference Range  Comments

 

 SODIUM (test code=MOSES)  141 mmol/L  138-146  

 

 POTASSIUM (test code=KI)  4.1 mmol/L  3.5-4.9  

 

 CHLORIDE (test code=CLI)  102 mmol/L    

 

 CA IONIZED (test code=ICAI)  1.18 mmol/L  1.12-1.32  

 

 GLUCOSE (test code=GLUI)  81 mg/dL    

 

 TCO2 (test code=TCO2)  28 mmol/L  24-29  

 

 BUN (test code=BUN1)  18 mg/dL  8-26  

 

 CREATININE (test code=CREAI)  1.0 mg/dL  0.6-1.3  

 

 ANION GAP (test code=GANG)  17.0 mmol/L    



PREGNANCY URINE **OW**2018 20:52:00





 Test Item  Value  Reference Range  Comments

 

 PREG UR (test code=PGU)  NEGATIVE  NEGATIVE  



CBC  (INCLUDES AUTOMATED DIFFERENTIAL) *2018 20:27:00





 Test Item  Value  Reference Range  Comments

 

 WBC (test code=WBC)  5.4 10\S\3/uL  4.5-11.0  

 

 RBC (test code=RBC)  4.65 10\S\6/uL  4.30-5.70  

 

 HGB (test code=HBG)  13.3 g/dL  12.0-15.5  

 

 HCT (test code=HCT)  40.3 %  35.0-44.0  

 

 MCV (test code=MCV)  86.7 fL  81.0-99.0  

 

 MCH (test code=MCH)  28.6 pg  27.0-31.0  

 

 MCHC (test code=MCHC)  33.0 g/dL  32.0-36.0  

 

 RDW (test code=RDW)  12.8 %  11.5-14.5  

 

 PLT (test code=PLT)  326 10\S\3/uL  130-400  

 

 MPV (test code=MPV)  7.9 fL  9.4-12.4  

 

 NEUTROP # (test code=NE#)  2.7 10\S\3/uL  1.6-8.0  

 

 LYMPH # (test code=LY#)  2.1 10\S\3/uL  1.1-3.5  

 

 MID # (test code=GMID#)  0.6 10\S\3/uL  0.0-1.1  

 

 GRA  % (test code=GRA%)  50.0 %  35.0-73.0  

 

 LYMPH % (test code=GLY%)  39.4 %  20.0-55.0  

 

 MID % (test code=GMID%)  10.6 %  0.0-10.0  



PROTHROMBIN TIME i-STAT **OW**2018 14:07:00





 Test Item  Value  Reference Range  Comments

 

 PT (test code=PT1)  12.2 s  10.0-13.0  

 

 INR (test code=INR)  1.0    

 

 INRH (test code=INRH)       **** SUGGESTED THERAPEUTIC RANGE    



   FOR INR: ****    2.5 - 3.5 **  For    



   Patients with Prosthetic Valves or    



   Patients with    



   recurrent Thromboembolic Events **    



   2.0 - 3.0 ** For Most Other    



   Applications **    



CBC  (INCLUDES AUTOMATED DIFFERENTIAL) *2018 14:02:00





 Test Item  Value  Reference Range  Comments

 

 WBC (test code=WBC)  2.6 10\S\3/uL  4.5-11.0  

 

 RBC (test code=RBC)  6.63 10\S\6/uL  4.30-5.70  

 

 HGB (test code=HBG)  18.6 g/dL  12.0-15.5  

 

 HCT (test code=HCT)  57.3 %  35.0-44.0  

 

 MCV (test code=MCV)  86.4 fL  81.0-99.0  

 

 MCH (test code=MCH)  28.1 pg  27.0-31.0  

 

 MCHC (test code=MCHC)  32.5 g/dL  32.0-36.0  

 

 RDW (test code=RDW)  12.6 %  11.5-14.5  

 

 PLT (test code=PLT)  117 10\S\3/uL  130-400  

 

 MPV (test code=MPV)  8.7 fL  9.4-12.4  

 

 NEUTROP # (test code=NE#)  1.0 10\S\3/uL  1.6-8.0  

 

 LYMPH # (test code=LY#)  1.3 10\S\3/uL  1.1-3.5  

 

 MID # (test code=GMID#)  0.3 10\S\3/uL  0.0-1.1  

 

 GRA  % (test code=GRA%)  39.8 %  35.0-73.0  

 

 LYMPH % (test code=GLY%)  49.0 %  20.0-55.0  

 

 MID % (test code=GMID%)  11.2 %  0.0-10.0  



U/S VENOUS DOPPLER LT UPPER EXT *OW*2018 00:30:16AFTER HOURS SERVICE ON:  12:30 AMLeft Upper Extremity Venous Duplex Doppler ExaminationLocation 
Code R20Lrscype: M79.89: OTHER SPECIFIED SOFT TISSUE DISORDERSTechnique: Real-
time gray scale, Doppler spectral analysis and Doppler colorflow evaluation was 
performed using a dedicated transducer. Graded compressionwith augmentation 
were performed. Findings: Upper extremity veins were sampled including the 
jugular, subclavian, axillary,brachial, basilic and cephalic veins. No 
echogenic filling defects are seen to suggest deep venous thrombosis. Thereis 
normal response to compression.  There are normal venous waveforms.  Impression:
No sonographic evidence of DVT in the imaged vessels.U/S [id number] 19:45:
12EXAM: US PELVIS TRANSABDOMINALEXAM: US PELVIS TRANSVAGINALDATE: 2018 6:04 
PM INDICATION: Pelvic pain, concern for ovarian torsion ADDITIONAL INFORMATION: 
33-year-old G6 P [id number]: None. TECHNIQUE:  Multiplanar grayscale and 
color Doppler ultrasound of the pelviswere obtained:Transabdominally through a 
distended urinary bladder.Transvaginally postvoid.LOCATION: [id number]: Uterus/
Myometrium:Size: 7.4 x 5.0 x 6.3 cmOrientation: AntevertedEchogenicity: 
Normal.Masses: None.Cervix: Normal.Endometrium: NormalThickness: 0.9 cmCysts/
Masses: None.Right ovary:Size: 4.4 x 4.2 x 4.0 cmCysts/Masses:There is a 
hypoechoic cyst with reticulated echogenic structuresinternally measuring 4.0 x 
3.6 x 3.3cm.Arterial and Doppler waveforms are demonstratedLeft ovary: Size: 
2.9 x 1.8 x 1.9 cmCysts/Masses: None.Arterial and Doppler waveforms are 
demonstratedAdnexa: Normal. Free fluid: None.Other: None.IMPRESSION:1.  Normal 
Doppler waveforms are demonstrated in each ovary.2.  Right ovarian hemorrhagic 
cyst.CT ABDOMEN AND PELVIS WITH [id number] 17:48:38EXAM:  CT ABDOMEN 
AND PELVIS WITH CONTRASTDATE: 2018 3:39 PM INDICATION: Abdominal pain 
ADDITIONAL INFORMATION: None.COMPARISON: None.TECHNIQUE: Volumetric CT 
acquisition of the abdomen and pelvis after theintravenous administration 
contrast. Axial, coronal and sagittalreconstructions.Postcontrastphases: Venous 
and delayed.IV contrast: 98 mL Omnipaque 300Enteric contrast: None.DLP: 1886 mGy
-cmLOCATION: [id number]:  Lines, tubes and hardware: None.Lower thorax: 
Clear.Liver: There is a lobulated lesion in the posterior aspect of the right 
hepaticlobe measures 1.8 x 3.0 cm. It is new compared to 2012.Biliary tree: No 
intra- or extrahepatic biliary ductal dilation.Gallbladder: Normal. No CT 
evidence of gallstones.Pancreas: Normal.Spleen: Normal.Adrenals: Normal.Kidneys 
and ureters: Normal.Bladder: Normal.Reproductive organs: There is a cyst in the 
right ovary measuring 4.0 x 3.3 cm.Gastrointestinal tract: Normal 
caliber.Appendix: Normal.Peritoneum and retroperitoneum: No ascites or free 
air. No other fluidcollection.Lymph nodes: Normal.Vasculature: Normal.Bones: 
There is moderate levoconvex scoliotic curvature of the lumbar spine.Soft 
tissues: Normal.IMPRESSION:  1.  No acute abnormality2.  A lobulated hypodense 
lesion at the posterior aspect of the right hepaticlobe measuring 3 x 1.8 cm 
isnew compared to 2012. The lesion hascharacteristics compatible with hepatic 
cyst. Given that the lesion is new, 3month interval follow up with abdominal 
ultrasound would be recommended.3.  Right ovarian cyst.RECOMMENDATIONS: 
None.CBC (INCLUDES AUTOMATED DIFFERENTIAL)2018 16:15:00





 Test Item  Value  Reference Range  Comments

 

 WBC (test code=WBC)  6.3 10\S\3/uL  4.5-11.0  

 

 RBC (test code=RBC)  4.98 10\S\6/uL  4.30-5.70  

 

 HGB (test code=HBG)  14.2 g/dL  12.0-15.5  

 

 HCT (test code=HCT)  43.4 %  35.0-44.0  

 

 MCV (test code=MCV)  87.1 fL  81.0-99.0  

 

 MCH (test code=MCH)  28.5 pg  27.0-31.0  

 

 MCHC (test code=MCHC)  32.7 g/dL  32.0-36.0  

 

 RDW (test code=RDW)  13.1 %  11.5-14.5  

 

 PLT (test code=PLT)  273 10\S\3/uL  130-400  

 

 MPV (test code=MPV)  10.5 fL  9.4-12.4  

 

 NEUTROP # (test code=NE#)  5.1 10\S\3/uL  1.6-8.0  

 

 LYMPH # (test code=LY#)  0.7 10\S\3/uL  1.1-3.5  

 

 MONOCYTE # (test code=MO#)  0.4 10\S\3/uL  0.0-1.1  

 

 EOSINOPH # (test code=EO#)  0.1 10\S\3/uL  0.0-0.7  

 

 BASOPHIL # (test code=BA#)  0.0 10\S\3/uL  0.0-0.3  

 

 IG # (test code=IG#)  0.02 10\S\3/uL  0.00-0.06  

 

 NRBC # (test code=NRBC#)  0.00 10\S\3/uL  0.00-0.01  

 

 NEUTROPH % (test code=NE%)  80.4 %  35.0-73.0  

 

 LYMPH % (test code=LY%)  11.1 %  20.0-55.0  

 

 MONO % (test code=MO%)  7.0 %  2.5-10.0  

 

 EOSINOPH % (test code=EO%)  1.0 %  0.0-5.0  

 

 BASOPHIL % (test code=BA%)  0.2 %  0.0-2.0  

 

 IG % (test code=IG%)  0.3 %  0.0-0.8  

 

 NRBC% (test code=NRBC%)  0.0 %  0.0-0.2  

 

 MANDIFF (test code=MDIFF)  NO  NO  

 

 RBC MORPH (test code=RBCMOR)    NORMAL  



URINALYSIS WITH [id number] 16:02:00





 Test Item  Value  Reference Range  Comments

 

 COLOR (test code=COLU)  YELLOW  YELLOW  

 

 CLARITY (test code=CLA)  HAZY  CLEAR  

 

 GLUCOSE UR (test code=UA GLUCOSE)  NEGATIVE  NEGATIVE  

 

 BILI UR (test code=BILE)  NEGATIVE  NEGATIVE  

 

 KETONES UR (test code=ACE)  1+  NEGATIVE  

 

 SP GRAVITY (test code=SPGR)  1.023  1.005-1.030  

 

 PH UR (test code=PH)  6.5  4.5-8.0  

 

 PROTEIN UR (test code=PU)  NEGATIVE  NEGATIVE  

 

 UROBIL UR (test code=UROQ)  1.0 EU/dL  0.2-1.0  

 

 NITRITE UR (test code=NITRITE)  NEGATIVE  NEGATIVE  

 

 BLOOD UR (test code=UA BLOOD)  NEGATIVE  NEGATIVE  

 

 LEUK ES UR (test code=LEUK)  1+  NEGATIVE  

 

 WBC UR (test code=UWBC)  5 /HPF  0-5  

 

 RBC UR (test code=URBC)  4 /HPF  0-2  

 

 EPITH  UR (test code=UEPC)  MODERATE /LPF  FEW  

 

 BACTERIA UR (test code=UBACT)  FEW /HPF  NONE  

 

 CAST UR (test code=CAST)   /LPF  NONE  

 

 CRYSTAL UR (test code=CRYU)   / LPF  NONE  

 

 MUCUS UR (test code=MUC)   / HPF  NONE  

 

 AMORPH UR (test code=LUIS)   / HPF  NONE  

 

 TRICH UR (test code=UTRICH)   /HPF  NONE  

 

 YEAST UR (test code=UY)   /HPF  NONE  

 

 SPERM UR (test code=USPERM)   /HPF  NONE  



PREGNANCY URINE [id number] 15:57:00





 Test Item  Value  Reference Range  Comments

 

 PREG UR (test code=PGU)  NEGATIVE  NEGATIVE  



XR ANKLE RIGHT COMPLETE 3 VIEWS **2018 01:19:57RIGHT ANKLE RADIOGRAPHS, 
3 VIEWSLOCATION: R16.INDICATION: [id number]: Ankle pain.COMPARISON: 
None.TECHNIQUE: AP, oblique, and lateral radiographs of the right ankle.FINDINGS
:There is no acute fracture or dislocation. The joint spaces are 
maintained.IMPRESSION:No acute osseous abnormality.U/S VENOUS DOPPLER LT UPPER 
EXT**2018 22:41:35EXAM: U/S VENOUS DOPPLER LT UPPER EXT**HISTORY: 
[id number]: Arm swellingTECHNIQUE: Multiplanar real-time ultrasonography of the 
left upper extremityvenous system using gray-scale imaging, supplementedby 
Doppler, augmentation,and compression maneuvers as needed.COMPARISON: 
None.FINDINGS: Internal jugular vein: PatentSubclavian vein:  Patent Axillary 
vein:  PatentBrachial vein: PatentCephalic vein:  PatentBasilic vein: Patent 
and compressibleRadial vein: PatentUlnar vein: PatentVenous compressibility:  
NormalWaveform response to augmentation:  NormalIMPRESSION:No evidence of deep 
vein thrombosisD-DIMER **2018 20:54:00





 Test Item  Value  Reference Range  Comments

 

 D-DIMER (test code=DDI)  <200 ng/mL D-DU  0-234  

 

 D-DIMER COMMENT (test  *Level to rule out DVT or PE:    



 code=DDCOM)  <235 ng/mL D-DU*    



PRO TIME AND PTT  *WW*2018 20:49:00





 Test Item  Value  Reference Range  Comments

 

 PT (test code=TT)  11.2 s  9.8-13.6  

 

 INR (test code=INR)  1.0    

 

 INRH (test code=INRH)       **** SUGGESTED THERAPEUTIC RANGE    



   FOR INR: ****    2.5 - 3.5 **  For    



   Patients with Prosthetic Valves or    



   Patients with    



   recurrent Thromboembolic Events **    



   2.0 - 3.0 ** For Most Other    



   Applications **    

 

 PTT (test code=PTT)  27.3 s  20.2-38.0  

 

 PTTH (test code=PTTH)  **** To monitor the effectiveness of    



   heparin, we offer the Anti-Xa    



   (Heparin Assay). It can be used for    



   either unfractionated or LMW  Heparin.    



   Order Code is ANTI-XA ****    



COMPREHENSIVE METABOLIC PAN *WW*2018 20:40:00





 Test Item  Value  Reference Range  Comments

 

 GLUCOSE (test code=06D)  94 mg/dL    

 

 SODIUM (test code=01A)  138 mmol/L  136-145  

 

 POTASSIUM (test code=01B)  3.8 mmol/L  3.6-5.1  

 

 CHLORIDE (test code=04A)  104 mmol/L    

 

 CO2 (test code=02A)  28 mmol/L  22-32  

 

 ANION GAP (test code=ANG)  9.8 mmol/L    

 

 BUN (test code=05D)  17 mg/dL  7-18  

 

 CREATININE (test code=03E)  0.9 mg/dL  0.4-1.1  

 

 BUN/CREA (test code=BCR)  19  12-20  

 

 CALCIUM (test code=09D)  8.7 mg/dL  8.3-9.5  

 

 BILI TOTAL (test code=11A)  0.2 mg/dL  0.2-1.0  

 

 PROTEIN (test code=07D)  8.0 g/dL  6.4-8.2  

 

 ALBUMIN (test code=08D)  4.2 g/dL  3.5-4.8  

 

 GLOBULIN (test code=GLB)  3.8 g/dL  1.5-3.8  

 

 ALB/GLOB (test code=AGRR)  1.1  1.0-2.6  

 

 ALK PHOS (test code=35A)  79 IU/L    

 

 AST (test code=30A)  14 IU/L  <=42  

 

 ALT (test code=31A)  20 IU/L  <=78  



CBC (INCLUDES AUTOMATED DIFFERENTIAL)*[id number] 20:25:00





 Test Item  Value  Reference Range  Comments

 

 WBC (test code=WBC)  7.8 10\S\3/uL  4.5-11.0  

 

 RBC (test code=RBC)  4.30 10\S\6/uL  4.30-5.70  

 

 HGB (test code=HBG)  12.4 g/dL  12.0-15.5  

 

 HCT (test code=HCT)  38.1 %  35.0-44.0  

 

 MCV (test code=MCV)  88.6 fL  81.0-99.0  

 

 MCH (test code=MCH)  28.8 pg  27.0-31.0  

 

 MCHC (test code=MCHC)  32.5 g/dL  32.0-36.0  

 

 RDW (test code=RDW)  13.4 %  11.5-14.5  

 

 PLT (test code=PLT)  275 10\S\3/uL  130-400  

 

 MPV (test code=MPV)  10.7 fL  9.4-12.4  

 

 NEUTROP # (test code=NE#)  4.8 10\S\3/uL  1.6-8.0  

 

 LYMPH # (test code=LY#)  2.0 10\S\3/uL  1.1-3.5  

 

 MONOCYTE # (test code=MO#)  0.7 10\S\3/uL  0.0-1.1  

 

 EOSINOPH # (test code=EO#)  0.2 10\S\3/uL  0.0-0.7  

 

 BASOPHIL # (test code=BA#)  0.1 10\S\3/uL  0.0-0.3  

 

 IG # (test code=IG#)  0.02 10\S\3/uL  0.00-0.06  

 

 NRBC # (test code=NRBC#)  0.00 10\S\3/uL  0.00-0.01  

 

 NEUTROPH % (test code=NE%)  61.7 %  35.0-73.0  

 

 LYMPH % (test code=LY%)  25.7 %  20.0-55.0  

 

 MONO % (test code=MO%)  8.6 %  2.5-10.0  

 

 EOSINOPH % (test code=EO%)  3.1 %  0.0-5.0  

 

 BASOPHIL % (test code=BA%)  0.6 %  0.0-2.0  

 

 IG % (test code=IG%)  0.3 %  0.0-0.8  

 

 NRBC% (test code=NRBC%)  0.0 %  0.0-0.2  

 

 MANDIFF (test code=WMDIFF)  NO  NO  

 

 RBC MORPH (test code=WRBCMOR)    NORMAL  



XR FOOT LEFT COMPLETE 3 VIEWS2018-01-15 22:21:34XR FOOT LEFT COMPLETE 3 
VIEWSLocation:P07Icpcz hours services provided1/15/2018 10:20 PMIndication:
Lfoot pain; fellComparison:Not availableFindings:Mild calcaneal spurring. No 
acute fracture or dislocation is seen. Thejoint spaces appear preserved. Bony 
mineralization appears normal. Noradiopaque foreign body.Impression:No acute 
bony abnormality.XR CHEST 2 VIEW *WW*2017 00:15:12Exam: Chest 2 
viewsLocation: P9Wkhhuqv: cough ans sobComparison: NoneFindings:The lungs are 
clear. No infiltrate or effusion is seen. The pulmonaryvasculature is normal. 
The heart size is normal. The mediastinal silhouette isunremarkable. The bony 
thorax is intact with a dextroscoliosis noted.Impression:No acute disease.

## 2018-08-18 NOTE — ER
Nurse's Notes                                                                                     

 [facility]                                                                

Name: Crystal Beaulieu                                                                                 

Age: 33 yrs                                                                                       

Sex: Female                                                                                       

: 1985                                                                                   

MRN: [id number]                                                                                   

Arrival Date: 2018                                                                          

Time: 12:01                                                                                       

Account#: [id number]                                                                            

Bed 18                                                                                            

Private MD: None, None                                                                            

Diagnosis: Pain in throat;Otitis media, unspecified, left ear                                     

                                                                                                  

Presentation:                                                                                     

                                                                                             

12:05 Presenting complaint: Patient states: "I was here about 3 days ago and was given        aa5 

      Augmentin for an ear infection and my strep came back negative that time but my throat      

      still hurts and I really feel like there is something stuck in my esophagus". Pt states     

      "I can't even swallow mashed potatoes or Jello without crying". Pt states "I haven't        

      eaten in about 2 days".                                                                     

12:05 Transition of care: patient was not received from another setting of care. Onset of     aa5 

      symptoms was 2018.                                                                   

12:05 Method Of Arrival: Ambulatory                                                           aa5 

12:05 Risk Assessment: Do you want to hurt yourself or someone else? Patient reports no       aa5 

      desire to harm self or others. Initial Sepsis Screen: Does the patient meet any 2           

      criteria? No. Patient's initial sepsis screen is negative. Does the patient have a          

      suspected source of infection? No. Patient's initial sepsis screen is negative. Care        

      prior to arrival: None.                                                                     

12:05 Acuity: DON 3                                                                           aa5 

                                                                                                  

OB/GYN:                                                                                           

12:23 LMP 2018                                                                           em  

                                                                                                  

Historical:                                                                                       

- Allergies:                                                                                      

12:10 Erythromycin;                                                                           aa5 

- PMHx:                                                                                           

12:10 Hypertension;                                                                           aa5 

- PSHx:                                                                                           

12:10 None;                                                                                   aa5 

                                                                                                  

- Immunization history:: Adult Immunizations up to date.                                          

- Ebola Screening: : No symptoms or risks identified at this time.                                

- Social history:: Smoking status: Patient/guardian denies using tobacco.                         

                                                                                                  

                                                                                                  

Screenin:25 Abuse screen: Denies threats or abuse. Nutritional screening: No deficits noted.        em  

      Tuberculosis screening: No symptoms or risk factors identified. Fall Risk None              

      identified.                                                                                 

                                                                                                  

Assessment:                                                                                       

12:20 General: Appears uncomfortable, Behavior is cooperative, anxious. Pain: Pain currently  em  

      is 10 out of 10 on a pain scale. Neuro: Level of Consciousness is awake, alert, obeys       

      commands, Oriented to person, place, time, situation. Cardiovascular: Capillary refill      

      < 3 seconds Patient's skin is warm and dry. Respiratory: Airway is patent Respiratory       

      effort is even, unlabored, Respiratory pattern is regular, symmetrical, Breath sounds       

      are clear bilaterally. GI: Abdomen is flat, Reports nausea, Patient currently denies        

      vomiting. : No signs and/or symptoms were reported regarding the genitourinary            

      system. EENT: Throat is clear is pink Reports difficulty swallowing pain when               

      swallowing. Derm: Skin is intact, Skin is pink, warm \T\ dry. Musculoskeletal: Range of     

      motion: intact in all extremities.                                                          

12:20 Reassessment: I agree with assessment completed by Win Arenas LVN .                   aa5 

13:09 Reassessment: Patient appears in no apparent distress at this time. Patient and/or      em  

      family updated on plan of care and expected duration. Pain level reassessed. Patient is     

      alert, oriented x 3, equal unlabored respirations, skin warm/dry/pink. reports pain         

      medication has not worked, "feels like I'm swallowing glass" MELVIN Mora notified, no       

      new orders received.                                                                        

14:00 Reassessment: Patient appears in no apparent distress at this time. Patient and/or      em  

      family updated on plan of care and expected duration. Pain level reassessed. Patient is     

      alert, oriented x 3, equal unlabored respirations, skin warm/dry/pink. Patient states       

      feeling better.                                                                             

15:07 Reassessment: Patient appears in no apparent distress at this time. Patient and/or      em  

      family updated on plan of care and expected duration. Pain level reassessed. Patient is     

      alert, oriented x 3, equal unlabored respirations, skin warm/dry/pink. Patient states       

      feeling better.                                                                             

                                                                                                  

Vital Signs:                                                                                      

12:06  / 101; Pulse 85; Resp 16 S; Temp 98.8(O); Pulse Ox 97% on R/A;                   aa5 

13:10  / 93; Pulse 78; Resp 16; Pulse Ox 97% on R/A;                                    em  

15:07  / 97; Pulse 75; Resp 16; Pulse Ox 98% on R/A; Pain 6/10;                         em  

                                                                                                  

ED Course:                                                                                        

12:01 Patient arrived in ED.                                                                  mr  

12:01 None, None is Private Physician.                                                        mr  

12:03 Arm band placed on Patient placed in an exam room, on a stretcher.                      aa5 

12:07 [facility]                                                        kb  

12:07 Vinay Villegas MD is Attending Physician.                                              kb  

12:09 Win Arenas LVN is Primary Nurse.                                                     em  

12:13 Triage completed.                                                                       aa5 

12:24 Patient has correct armband on for positive identification. Bed in low position. Call   em  

      light in reach. Adult w/ patient.                                                           

12:47 X-ray completed. Portable x-ray completed in exam room. Patient tolerated procedure     la2 

      well.                                                                                       

13:01 Neck Soft Tissue XRAY In Process Unspecified.                                           EDMS

13:10 No provider procedures requiring assistance completed.                                  em  

15:06 Patient did not have IV access during this emergency room visit.                        em  

                                                                                                  

Administered Medications:                                                                         

12:37 Drug: TORadol 60 mg Route: IM; Site: right gluteus;                                     em  

15:08 Follow up: Response: No adverse reaction; Pain is decreased                             em  

14:21 Drug: GI Cocktail without Donnatal - (Maalox Suspension 30 ml, Lidocaine Liquid 2 % 15  em  

      ml) Route: PO;                                                                              

15:08 Follow up: Response: No adverse reaction; Pain is decreased                             em  

                                                                                                  

                                                                                                  

Outcome:                                                                                          

14:37 Discharge ordered by MD.                                                                kb  

15:06 Discharged to home ambulatory, with family.                                             em  

15:06 Condition: good                                                                             

15:06 Discharge instructions given to patient, family, Instructed on discharge instructions,      

      follow up and referral plans. Demonstrated understanding of instructions, follow-up         

      care.                                                                                       

15:10 Patient left the ED.                                                                    em  

                                                                                                  

Signatures:                                                                                       

Dispatcher [employer]                           EDMS                                                 

Sammi Harris, FNP-C                 FNP-Ckb                                                   

Shiela Moreno                                                   

ArenasWin LVN LVN  em                                                   

Glendy Lemos RN                     RN   aa5                                                  

Kesha Faye                               la2                                                  

                                                                                                  

Corrections: (The following items were deleted from the chart)                                    

12:48 12:47 CT completed. la2                                                                 la2 

                                                                                                  

**************************************************************************************************

## 2018-08-18 NOTE — RAD REPORT
EXAM DESCRIPTION:

RAD - Neck Soft Tissue - 8/18/2018 1:01 pm

 

CLINICAL HISTORY:  Dysphasia

 

FINDINGS:  The prevertebral soft tissues appear normal.

 

A definite radiopaque foreign body is not seen.

 

No gross abnormality of the airway is noted.

## 2018-08-18 NOTE — XMS REPORT
Clinical Summary

 Created on:2018



Patient:Crystal Beaulieu

Sex:Female

:1985

External Reference #:[id number]





Demographics







 Address  [address] [place] [address]

 

 Home Phone  [phone]

 

 Email Address  [email or web address]

 

 Preferred Language  English

 

 Marital Status  

 

 Restoration Affiliation  Unknown

 

 Race  White

 

 Ethnic Group   or 









Author







 Organization  [facility]

 

 Address  [address] [place] [address]









Support







 Name  Relationship  Address  Phone

 

 None,None  Unavailable  [address] [facility]  [phone]



     [place] [address]  









Care Team Providers







 Name  Role  Phone

 

 Florence Casiano DO  Primary Care Provider  [phone]









Allergies







 Active Allergy  Reactions  Severity  Noted Date  Comments

 

 Azithromycin      06/10/2018  

 

 Hydromorphone  Itching    06/10/2018  







Current Medications







 Prescription  Sig.  Disp.  Refills  Start Date  End Date  Status

 

 acetaminophen-codeine  Take 1 tablet by  12 tablet  0  2018  




 (TYLENOL WITH CODEINE  mouth every 6          



 #3) 300-30 mg per  (six) hours as          



 tablet  needed for          



   moderate pain          



   for up to 7          



   days.          







Active Problems

Not on file



Encounters







 Date  Type  Specialty  Care Team  Description

 

 06/10/2018 -  Emergency  Emergency Medicine  Edgardo Tony  Contusion of 
left



 2018      DO Murray  upper extremity,



         initial encounter



         (Primary Dx)



after 2017



Social History







 Tobacco Use  Types  Packs/Day  Years Used  Date

 

 Never Smoker        









 Smokeless Tobacco: Never Used      









 Alcohol Use  Drinks/Week  oz/Week  Comments

 

 Yes      socially









 Sex Assigned at Birth  Date Recorded

 

 Not on file  







Last Filed Vital Signs







 Vital Sign  Reading  Time Taken

 

 Blood Pressure  122/87  2018  1:07 AM CDT

 

 Pulse  85  2018  1:07 AM CDT

 

 Temperature  35.7 C (96.2 F)  06/10/2018 10:31 PM CDT

 

 Respiratory Rate  16  2018  1:07 AM CDT

 

 Oxygen Saturation  99%  2018  1:07 AM CDT

 

 Inhaled Oxygen Concentration  -  -

 

 Weight  -  -

 

 Height  167.6 cm (5' 6")  06/10/2018 10:20 PM CDT

 

 Body Mass Index  -  -







Plan of Treatment







 Health Maintenance  Due Date  Last Done  Comments

 

 CERVICAL CANCER SCREENING  2006    

 

 INFLUENZA VACCINE  2018    







Procedures







 Procedure Name  Priority  Date/Time  Associated Diagnosis  Comments

 

  DUPLEX VENOUS  STAT  2018 12:18 AM    Results for this



 UPPER EXTREMITY    CDT    procedure are in



 LEFT        the results



         section.



after 2017



Results

PV Duplex Venous Upper Extremity (2018 12:18 AM)





 Narrative  Performed At

 

 US DUPLEX VENOUS UPPER EXTREMITY LEFT



   [facility]



  



  



 CLINICAL INDICATION:lue bruisingupper arm and ac spacefrom  



 iv's...now with pain worsening



  



  



  



 COMPARISON:None.



  



  



  



 COMMENTS:



  



  



  



 Sonographic evaluation of the left upper extremity was performed  



 utilizing compression sonography and color Doppler interrogation.



  



  



  



 The left internal jugular vein, subclavian vein, axillary vein, brachial  



 veins, basilic vein and cephalic vein are normal in course and caliber  



 with normal color Doppler signal. There is normal compressibility of  



 these vessels and flow variation where 



  



 interrogated.



  



  



  



 Visualized veins in the forearm are unremarkable without thrombosis.



  



  



  



 The contralateral subclavian vein was also interrogated and demonstrates  



 appropriate Doppler signal.



  



  



  



 There is an ill-defined echogenic area within the soft tissues in the  



 area of bruising that measures 2.1 x 0.8 x 1.5 cm in size. This most  



 likely represents a small, subcutaneous hematoma.



  



  



  



  



  



 IMPRESSION:



  



  



  



 1. No sonographic evidence of deep venous thrombosis in the left upper  



 extremity. 



  



  



  



 2. Ill-defined echogenic area within the region of bruising, measuring  



 2.1 x 0.8 x 1.5 cm in size and likely representing a small subcutaneous  



 hematoma.



  



  



  



  



  



  



  



  



  



 [facility]-[id number]



  



   









 Procedure Note

 

  Interface, Radiology Results Incoming - 2018 12:25 AM CDT



US DUPLEX VENOUS UPPER EXTREMITY LEFT



 



 CLINICAL INDICATION:  lue bruising  upper arm and ac space  from iv's...now 
with pain worsening



 



 COMPARISON:  None.



 



 COMMENTS:



 



 Sonographic evaluation of the left upper extremity was performed utilizing 
compression sonography and color Doppler interrogation.



 



 The left internal jugular vein, subclavian vein, axillary vein, brachial veins
, basilic vein and cephalic vein are normal in course and caliber with normal 
color Doppler signal. There is normal compressibility of these



 vessels and flow variation where



 interrogated.



 



 Visualized veins in the forearm are unremarkable without thrombosis.



 



 The contralateral subclavian vein was also interrogated and demonstrates 
appropriate Doppler signal.



 



 There is an ill-defined echogenic area within the soft tissues in the area of 
bruising that measures 2.1 x 0.8 x 1.5 cm in size. This most likely represents 
a small, subcutaneous hematoma.



 



 



 IMPRESSION:



 



 1. No sonographic evidence of deep venous thrombosis in the left upper 
extremity.



 



 2. Ill-defined echogenic area within the region of bruising, measuring 2.1 x 
0.8 x 1.5 cm in size and likely representing a small subcutaneous hematoma.



 



 



 



 



 [facility]-[id number]









 Performing Organization  Address  City/State/Zipcode  Phone Number

 

 [facility]  [address] [place] [address]  



after 2017

## 2018-08-18 NOTE — EDPHYS
Physician Documentation                                                                           

 [facility]                                                                

Name: Crystal Beaulieu                                                                                 

Age: 33 yrs                                                                                       

Sex: Female                                                                                       

: 1985                                                                                   

MRN: [id number]                                                                                   

Arrival Date: 2018                                                                          

Time: 12:01                                                                                       

Account#: [id number]                                                                            

Bed 18                                                                                            

Private MD: None, None                                                                            

ED Physician Vinay Villegas                                                                       

HPI:                                                                                              

                                                                                             

12:30 This 33 yrs old  Female presents to ER via Ambulatory with complaints of Sore  kb  

      Throat.                                                                                     

12:30 The patient presents with sore throat. The patient describes throat pain as constant.   kb  

      Onset: The symptoms/episode began/occurred 3 day(s) ago. Severity of symptoms: At their     

      worst the symptoms were moderate, in the emergency department the symptoms are              

      unchanged. Modifying factors: The symptoms are alleviated by nothing, the symptoms are      

      aggravated by swallowing. Associated signs and symptoms: Pertinent positives: earache,      

      fever, Sore throat. The patient has not experienced similar symptoms in the past. The       

      patient has been recently seen at the [facility] Emergency           

      Department, this week, for similar complaints labs were performed, was given a              

      prescription for antibiotics, was given a prescription for pain medications.                

12:32 Pt states she was seen for ear and throat pain 2 days ago. States she thought it was an kb  

      ear infection and that is what she was diagnosed with, but the antibiotics and pain         

      medication aren't working. States it feels like there is something stuck in her throat.     

      Pain on the left side that goes up to ear. .                                                

                                                                                                  

OB/GYN:                                                                                           

12:23 LMP 2018                                                                           em  

                                                                                                  

Historical:                                                                                       

- Allergies:                                                                                      

12:10 Erythromycin;                                                                           aa5 

- PMHx:                                                                                           

12:10 Hypertension;                                                                           aa5 

- PSHx:                                                                                           

12:10 None;                                                                                   aa5 

                                                                                                  

- Immunization history:: Adult Immunizations up to date.                                          

- Ebola Screening: : No symptoms or risks identified at this time.                                

- Social history:: Smoking status: Patient/guardian denies using tobacco.                         

                                                                                                  

                                                                                                  

ROS:                                                                                              

12:32 Cardiovascular: Negative for chest pain, palpitations, and edema, Respiratory: Negative kb  

      for shortness of breath, cough, wheezing, and pleuritic chest pain, Abdomen/GI:             

      Negative for abdominal pain, nausea, vomiting, diarrhea, and constipation, Back:            

      Negative for injury and pain, : Negative for injury, bleeding, discharge, and             

      swelling, MS/Extremity: Negative for injury and deformity, Skin: Negative for injury,       

      rash, and discoloration, Neuro: Negative for headache, weakness, numbness, tingling,        

      and seizure.                                                                                

12:32 Constitutional: Positive for fever, Negative for body aches, chills, fatigue, malaise,      

      poor PO intake, weight loss.                                                                

12:32 ENT: Positive for ear pain, sore throat.                                                    

                                                                                                  

Exam:                                                                                             

12:32 Constitutional:  This is a well developed, well nourished patient who is awake, alert,  kb  

      and in no acute distress. Head/Face:  Normocephalic, atraumatic. Chest/axilla:  Normal      

      chest wall appearance and motion.  Nontender with no deformity.  No lesions are             

      appreciated. Cardiovascular:  Regular rate and rhythm with a normal S1 and S2.  No          

      gallops, murmurs, or rubs.  Normal PMI, no JVD.  No pulse deficits. Respiratory:  Lungs     

      have equal breath sounds bilaterally, clear to auscultation and percussion.  No rales,      

      rhonchi or wheezes noted.  No increased work of breathing, no retractions or nasal          

      flaring. Abdomen/GI:  Soft, non-tender, with normal bowel sounds.  No distension or         

      tympany.  No guarding or rebound.  No evidence of tenderness throughout. Skin:  Warm,       

      dry with normal turgor.  Normal color with no rashes, no lesions, and no evidence of        

      cellulitis. MS/ Extremity:  Pulses equal, no cyanosis.  Neurovascular intact.  Full,        

      normal range of motion. Neuro:  Awake and alert, GCS 15, oriented to person, place,         

      time, and situation.  Cranial nerves II-XII grossly intact.  Motor strength 5/5 in all      

      extremities.  Sensory grossly intact.  Cerebellar exam normal.  Normal gait.                

12:32 ENT: External ear(s): are unremarkable, Ear canal(s): are normal, TM's: erythema, that      

      is moderate, on the left, Examination of the other ear shows no obvious abnormality,        

      Posterior pharynx: Airway: normal, no evidence of obstruction, Tonsils: are normal in       

      appearance, Uvula: normal, midline, erythema, that is moderate.                             

                                                                                                  

Vital Signs:                                                                                      

12:06  / 101; Pulse 85; Resp 16 S; Temp 98.8(O); Pulse Ox 97% on R/A;                   aa5 

13:10  / 93; Pulse 78; Resp 16; Pulse Ox 97% on R/A;                                    em  

15:07  / 97; Pulse 75; Resp 16; Pulse Ox 98% on R/A; Pain 6/10;                         em  

                                                                                                  

MDM:                                                                                              

12:08 Patient medically screened.                                                             kb  

12:33 Data reviewed: vital signs, nurses notes. Data interpreted: Pulse oximetry: on room air kb  

      is 97 %. Interpretation: normal.                                                            

13:44 Counseling: I had a detailed discussion with the patient and/or guardian regarding: the kb  

      historical points, exam findings, and any diagnostic results supporting the                 

      discharge/admit diagnosis, radiology results, the need for outpatient follow up, a          

      family practitioner, to return to the emergency department if symptoms worsen or            

      persist or if there are any questions or concerns that arise at home.                       

                                                                                                  

                                                                                             

12:57 Order name: Urine Dipstick--Ancillary (enter results); Complete Time: 13:14             bd  

                                                                                             

12:57 Order name: Urine Pregnancy--Ancillary (enter results); Complete Time: 13:14            bd  

                                                                                             

12:18 Order name: Neck Soft Tissue XRAY; Complete Time: 13:19                                 kb  

                                                                                                  

Administered Medications:                                                                         

12:37 Drug: TORadol 60 mg Route: IM; Site: right gluteus;                                     em  

15:08 Follow up: Response: No adverse reaction; Pain is decreased                             em  

14:21 Drug: GI Cocktail without Donnatal - (Maalox Suspension 30 ml, Lidocaine Liquid 2 % 15  em  

      ml) Route: PO;                                                                              

15:08 Follow up: Response: No adverse reaction; Pain is decreased                             em  

                                                                                                  

                                                                                                  

Disposition:                                                                                      

16:12 Co-signature as Attending Physician, Vinya Villegas MD.                                    

                                                                                                  

Disposition:                                                                                      

18 14:37 Discharged to Home. Impression: Pain in throat, Otitis media, unspecified, left    

  ear.                                                                                            

- Condition is Stable.                                                                            

- Discharge Instructions: Otitis Media, Adult, Easy-to-Read, Sore Throat, Easy-to-Read.           

                                                                                                  

- Medication Reconciliation Form, Thank You Letter, Antibiotic Education, Prescription            

  Opioid Use form.                                                                                

- Follow up: Emergency Department; When: As needed; Reason: Worsening of condition.               

  Follow up: Private Physician; When: 2 - 3 days; Reason: Recheck today's complaints,             

  Continuance of care, Re-evaluation by your physician.                                           

                                                                                                  

                                                                                                  

                                                                                                  

Signatures:                                                                                       

Dispatcher [employer]                           Sammi Dumont, NEYMAR-C                 FNP-Win Mccualey, ELIDAN                       LVN  Glendy Guevara, RN                     RN   aa5                                                  

Vinay Villegas MD MD   gs                                                   

                                                                                                  

Corrections: (The following items were deleted from the chart)                                    

15:10 14:37 2018 14:37 Discharged to Home. Impression: Pain in throat; Otitis media,    em  

      unspecified, left ear. Condition is Stable. Discharge Instructions: Otitis Media,           

      Adult, Easy-to-Read, Sore Throat, Easy-to-Read. Forms are Medication Reconciliation         

      Form, Thank You Letter, Antibiotic Education, Prescription Opioid Use. Follow up:           

      Emergency Department; When: As needed; Reason: Worsening of condition. Follow up:           

      Private Physician; When: 2 - 3 days; Reason: Recheck today's complaints, Continuance of     

      care, Re-evaluation by your physician. kb                                                   

                                                                                                  

**************************************************************************************************